# Patient Record
Sex: FEMALE | Race: WHITE | NOT HISPANIC OR LATINO | Employment: OTHER | URBAN - METROPOLITAN AREA
[De-identification: names, ages, dates, MRNs, and addresses within clinical notes are randomized per-mention and may not be internally consistent; named-entity substitution may affect disease eponyms.]

---

## 2024-05-07 ENCOUNTER — TELEPHONE (OUTPATIENT)
Dept: SURGERY | Facility: CLINIC | Age: 64
End: 2024-05-07
Payer: MEDICAID

## 2024-05-07 NOTE — TELEPHONE ENCOUNTER
----- Message from Mirian Collier MA sent at 5/7/2024  5:00 PM CDT -----  Contact: self    ----- Message -----  From: Argentina Trevino  Sent: 5/7/2024   4:50 PM CDT  To: Ziggy Cordova Staff; Anoop RUFFIN Staff    Type:  Sooner Apoointment Request    Caller is requesting a sooner appointment.  Caller declined first available appointment listed below.  Caller will not accept being placed on the waitlist and is requesting a message be sent to doctor.  Name of Caller:Theresa Hatch  When is the first available appointment?06/2024  Symptoms:hernia  Would the patient rather a call back or a response via [facility]?   Best Call Back Number:[phone]  Additional Information: heartburn, shoulder pain left    Pt was told to go to the ER asap

## 2024-05-14 ENCOUNTER — TELEPHONE (OUTPATIENT)
Dept: SURGERY | Facility: CLINIC | Age: 64
End: 2024-05-14
Payer: MEDICAID

## 2024-05-14 NOTE — TELEPHONE ENCOUNTER
----- Message from Sue Carrasquillo sent at 5/13/2024  3:04 PM CDT -----  Contact: self  Patient is requesting a call back regarding scheduling. Please call back at [phone] or [phone]    Pt was called to see what appt would be scheduled for pt stated she wasn't sure and that daughter called. Pt stated she will see and give office a call back

## 2024-05-15 ENCOUNTER — TELEPHONE (OUTPATIENT)
Dept: GASTROENTEROLOGY | Facility: CLINIC | Age: 64
End: 2024-05-15
Payer: MEDICAID

## 2024-05-15 NOTE — TELEPHONE ENCOUNTER
----- Message from Prabha Calhoun MA sent at 5/15/2024 10:03 AM CDT -----  Regarding: FW: Appointment  Contact: patient  Would you tell this pt that we dont accept her insurance for me please?  ----- Message -----  From: Ambar Moctezuma  Sent: 5/13/2024   4:05 PM CDT  To: Anoop RUFFIN Staff  Subject: Appointment                                      Per phone call with patient, she stated that she would like to schedule an appointment to see the physician regarding a hiatal hernia.  Please return call at [phone] (home).    Thanks,  SJ

## 2025-05-22 ENCOUNTER — TELEPHONE (OUTPATIENT)
Dept: SURGERY | Facility: CLINIC | Age: 65
End: 2025-05-22
Payer: MEDICARE

## 2025-05-23 NOTE — TELEPHONE ENCOUNTER
Okay to schedule pt  
Requested manometry report- please let me know once received  
13-Jul-2022 13:24

## 2025-08-23 ENCOUNTER — PATIENT MESSAGE (OUTPATIENT)
Dept: RESEARCH | Facility: HOSPITAL | Age: 65
End: 2025-08-23
Payer: MEDICARE